# Patient Record
Sex: MALE | Race: BLACK OR AFRICAN AMERICAN | NOT HISPANIC OR LATINO | URBAN - METROPOLITAN AREA
[De-identification: names, ages, dates, MRNs, and addresses within clinical notes are randomized per-mention and may not be internally consistent; named-entity substitution may affect disease eponyms.]

---

## 2020-07-16 ENCOUNTER — HOSPITAL ENCOUNTER (EMERGENCY)
Facility: HOSPITAL | Age: 25
Discharge: HOME | End: 2020-07-16
Admitting: EMERGENCY MEDICINE

## 2020-07-16 VITALS
SYSTOLIC BLOOD PRESSURE: 128 MMHG | DIASTOLIC BLOOD PRESSURE: 80 MMHG | WEIGHT: 220.9 LBS | OXYGEN SATURATION: 99 % | BODY MASS INDEX: 39.14 KG/M2 | HEIGHT: 63 IN | TEMPERATURE: 98.4 F | RESPIRATION RATE: 18 BRPM | HEART RATE: 94 BPM

## 2020-07-16 DIAGNOSIS — R51.9 ACUTE NONINTRACTABLE HEADACHE, UNSPECIFIED HEADACHE TYPE: ICD-10-CM

## 2020-07-16 DIAGNOSIS — Z20.822 CLOSE EXPOSURE TO COVID-19 VIRUS: Primary | ICD-10-CM

## 2020-07-16 PROCEDURE — 99283 EMERGENCY DEPT VISIT LOW MDM: CPT

## 2020-07-16 PROCEDURE — C9803 HOPD COVID-19 SPEC COLLECT: HCPCS | Performed by: PHYSICIAN ASSISTANT

## 2020-07-16 PROCEDURE — U0002 COVID-19 LAB TEST NON-CDC: HCPCS | Performed by: PHYSICIAN ASSISTANT

## 2020-07-16 PROCEDURE — U0004 COV-19 TEST NON-CDC HGH THRU: HCPCS | Performed by: PHYSICIAN ASSISTANT

## 2020-07-17 ENCOUNTER — PATIENT OUTREACH (OUTPATIENT)
Dept: CASE MANAGEMENT | Facility: OTHER | Age: 25
End: 2020-07-17

## 2020-07-17 LAB
REF LAB TEST METHOD: NORMAL
SARS-COV-2 RNA RESP QL NAA+PROBE: NOT DETECTED

## 2025-03-17 ENCOUNTER — HOSPITAL ENCOUNTER (OUTPATIENT)
Facility: HOSPITAL | Age: 30
Discharge: HOME | End: 2025-03-17
Attending: EMERGENCY MEDICINE | Admitting: EMERGENCY MEDICINE
Payer: MEDICAID

## 2025-03-17 ENCOUNTER — APPOINTMENT (OUTPATIENT)
Dept: GENERAL RADIOLOGY | Facility: HOSPITAL | Age: 30
End: 2025-03-17
Payer: MEDICAID

## 2025-03-17 VITALS
HEART RATE: 76 BPM | TEMPERATURE: 98.5 F | WEIGHT: 182.2 LBS | DIASTOLIC BLOOD PRESSURE: 72 MMHG | RESPIRATION RATE: 18 BRPM | HEIGHT: 64 IN | SYSTOLIC BLOOD PRESSURE: 132 MMHG | OXYGEN SATURATION: 99 % | BODY MASS INDEX: 31.1 KG/M2

## 2025-03-17 DIAGNOSIS — M79.641 RIGHT HAND PAIN: Primary | ICD-10-CM

## 2025-03-17 PROCEDURE — G0463 HOSPITAL OUTPT CLINIC VISIT: HCPCS

## 2025-03-17 PROCEDURE — 73130 X-RAY EXAM OF HAND: CPT

## 2025-03-17 RX ORDER — IBUPROFEN 400 MG/1
800 TABLET, FILM COATED ORAL ONCE
Status: COMPLETED | OUTPATIENT
Start: 2025-03-17 | End: 2025-03-17

## 2025-03-17 RX ORDER — IBUPROFEN 800 MG/1
800 TABLET, FILM COATED ORAL EVERY 6 HOURS PRN
Qty: 28 TABLET | Refills: 0 | Status: SHIPPED | OUTPATIENT
Start: 2025-03-17 | End: 2025-03-24

## 2025-03-17 RX ADMIN — IBUPROFEN 800 MG: 400 TABLET, FILM COATED ORAL at 18:10

## 2025-03-17 NOTE — Clinical Note
[facility] [place] [id number] [place] [address] [place] [address]  Phone: [phone]    Sudeep Jacobson was seen and treated in our emergency department on 3/17/2025.  He may return to work on 03/18/2025.         Thank you for choosing [facility].    Barbara Dasilva, VERENA

## 2025-03-17 NOTE — FSED PROVIDER NOTE
Subjective   History of Present Illness  Patient is a 29-year-old male who presents today with right hand.  He denies any new injury but does report that 2 years ago he broke his hand.  He reports he was helping move and noticed the swelling shortly after.  He denies numbness, tingling or loss of sensation.  He has no snuffbox tenderness.        Review of Systems   All other systems reviewed and are negative.      No past medical history on file.    No Known Allergies    No past surgical history on file.    No family history on file.    Social History     Socioeconomic History    Marital status:            Objective   Physical Exam  Vitals and nursing note reviewed.   Constitutional:       General: He is not in acute distress.     Appearance: Normal appearance. He is not ill-appearing, toxic-appearing or diaphoretic.   HENT:      Head: Normocephalic.      Nose: No congestion or rhinorrhea.      Mouth/Throat:      Mouth: Mucous membranes are moist.      Pharynx: No oropharyngeal exudate or posterior oropharyngeal erythema.   Eyes:      General:         Right eye: No discharge.         Left eye: No discharge.      Pupils: Pupils are equal, round, and reactive to light.   Cardiovascular:      Rate and Rhythm: Normal rate and regular rhythm.      Heart sounds: No murmur heard.     No friction rub.   Pulmonary:      Effort: Pulmonary effort is normal. No respiratory distress.      Breath sounds: Normal breath sounds. No stridor. No wheezing, rhonchi or rales.   Chest:      Chest wall: No tenderness.   Abdominal:      General: Abdomen is flat. Bowel sounds are normal. There is no distension.      Palpations: Abdomen is soft.      Tenderness: There is no abdominal tenderness. There is no right CVA tenderness, left CVA tenderness, guarding or rebound.      Hernia: No hernia is present.   Musculoskeletal:         General: No swelling, tenderness, deformity or signs of injury. Normal range of motion.      Cervical  back: Normal range of motion.      Right lower leg: No edema.      Left lower leg: No edema.   Skin:     General: Skin is warm.      Capillary Refill: Capillary refill takes less than 2 seconds.      Coloration: Skin is not jaundiced or pale.      Findings: No bruising, erythema, lesion or rash.   Neurological:      General: No focal deficit present.      Mental Status: He is alert and oriented to person, place, and time.      Cranial Nerves: No cranial nerve deficit.      Sensory: No sensory deficit.      Motor: No weakness.      Coordination: Coordination normal.      Gait: Gait normal.      Deep Tendon Reflexes: Reflexes normal.   Psychiatric:         Mood and Affect: Mood normal.         Behavior: Behavior normal.         Thought Content: Thought content normal.         Judgment: Judgment normal.         Procedures           ED Course                                           Medical Decision Making  Patient is a 29-year-old male who presents today with right hand.  He denies any new injury but does report that 2 years ago he broke his hand.  He reports he was helping move and noticed the swelling shortly after.  He denies numbness, tingling or loss of sensation.  He has no snuffbox tenderness.    Upon exam patient is awake and alert, nontoxic-appearing, appears in no acute distress, and is answering questions appropriately.  Lung sounds are clear and equal bilaterally.  Heart is normal rate and rhythm.  Mucous membranes are moist.  Patient reports right hand.  There is no edema, erythema or trauma present.  Patient has full range of motion.  Cap refills less than 2, and he has a strong radial pulse.  An x-ray was obtained and showed nothing acute.  I treated him with an 800 ibuprofen and advised him to ice and elevate his hand and 20-minute increments several times a day. I Advised him to return to the ER for any new or worsening symptom.    Problems Addressed:  Right hand pain: complicated acute illness or  injury    Amount and/or Complexity of Data Reviewed  Radiology: ordered.    Risk  Prescription drug management.        Final diagnoses:   Right hand pain       ED Disposition  ED Disposition       ED Disposition   Discharge    Condition   Stable    Comment   --               Mary Bailey MD  [address]  [address]  [place] [id number] [phone]          [facility] [place]  [address]  [facility] [address]  [phone]  In 1 week  As needed, If symptoms worsen    PATIENT CONNECTION - [facility] [address]  [phone]  Schedule an appointment as soon as possible for a visit            Medication List        New Prescriptions      ibuprofen 800 MG tablet  Commonly known as: ADVIL,MOTRIN  Take 1 tablet by mouth Every 6 (Six) Hours As Needed for Mild Pain for up to 7 days.               Where to Get Your Medications        These medications were sent to [id number] - [place], [place] - [address] [facility] [phone] [facility] [phone]   [address] [place] [address]      Phone: [phone]   ibuprofen 800 MG tablet

## 2025-04-27 ENCOUNTER — HOSPITAL ENCOUNTER (EMERGENCY)
Facility: HOSPITAL | Age: 30
Discharge: HOME | End: 2025-04-27
Attending: EMERGENCY MEDICINE | Admitting: EMERGENCY MEDICINE

## 2025-04-27 ENCOUNTER — APPOINTMENT (OUTPATIENT)
Dept: GENERAL RADIOLOGY | Facility: HOSPITAL | Age: 30
End: 2025-04-27

## 2025-04-27 VITALS
WEIGHT: 188.4 LBS | TEMPERATURE: 97.8 F | RESPIRATION RATE: 18 BRPM | DIASTOLIC BLOOD PRESSURE: 72 MMHG | BODY MASS INDEX: 32.17 KG/M2 | HEIGHT: 64 IN | HEART RATE: 82 BPM | SYSTOLIC BLOOD PRESSURE: 136 MMHG | OXYGEN SATURATION: 98 %

## 2025-04-27 DIAGNOSIS — S60.221A CONTUSION OF RIGHT HAND, INITIAL ENCOUNTER: Primary | ICD-10-CM

## 2025-04-27 PROCEDURE — 73130 X-RAY EXAM OF HAND: CPT

## 2025-04-27 PROCEDURE — 99283 EMERGENCY DEPT VISIT LOW MDM: CPT

## 2025-04-28 NOTE — DISCHARGE INSTRUCTIONS
You may soak with Epsom salt warm water, use ice if desired.  Take Tylenol and ibuprofen as needed for pain.  You can elevate as well.    There is no evidence of fracture on your x-ray.  They do see an old healed fifth metacarpal fracture.    Return to the emergency department for worsening symptoms, worsening swelling or pain.

## 2025-04-28 NOTE — FSED PROVIDER NOTE
Subjective   History of Present Illness  29-year-old male presents with pain in the right hand.  He states the cover to a barge crossed his hand on Thursday.  He states that he is here today because now the adrenaline has finally subsided and he is having some pain.  His capillary refill is brisk.  He has full range of motion to the hand but tenderness around the third metacarpal.  I am awaiting x-ray results.  Patient denied offer of Tylenol.  He took ibuprofen prior to arrival, 800 mg    History provided by:  Patient   used: No        Review of Systems   Musculoskeletal:  Positive for arthralgias (Right hand pain, patient is right-hand dominant) and joint swelling (Minimal swelling around the third metacarpal).   Skin:  Negative for color change, pallor and wound.   All other systems reviewed and are negative.      History reviewed. No pertinent past medical history.    No Known Allergies    History reviewed. No pertinent surgical history.    History reviewed. No pertinent family history.    Social History     Socioeconomic History    Marital status:    Tobacco Use    Smoking status: Never   Substance and Sexual Activity    Alcohol use: Yes     Comment: rare           Objective   Physical Exam  Vitals and nursing note reviewed.   Constitutional:       General: He is not in acute distress.     Appearance: Normal appearance. He is not ill-appearing, toxic-appearing or diaphoretic.   Pulmonary:      Effort: Pulmonary effort is normal.   Musculoskeletal:         General: Swelling (Mid swelling to the third metacarpal), tenderness and signs of injury present. No deformity. Normal range of motion.      Comments: Complains of some numbness to the area.   Skin:     General: Skin is warm and dry.      Capillary Refill: Capillary refill takes less than 2 seconds.      Findings: No erythema, lesion or rash.   Neurological:      Mental Status: He is alert and oriented to person, place, and time.    Psychiatric:         Mood and Affect: Mood normal.         Behavior: Behavior normal.         Procedures           ED Course  ED Course as of 04/27/25 2310   Sun Apr 27, 2025   [address] Reading Physician Reading Date Result Priority  Lucas Parekh MD  [phone]  4/27/2025 STAT    Narrative & Impression  XR HAND 3+ VW RIGHT     Date of Exam: 4/27/2025 9:48 PM EDT     Indication: right hand pain s/p 2000 lb lid falling on hand 4 days ago     Comparison: 3/17/2025.     Findings:  There is a stable old healed fifth metacarpal shaft fracture with mild residual dorsal bowing. There is no acute fracture or dislocation. No erosions. Soft tissues are unremarkable. Joint spaces appear intact.     IMPRESSION:  Impression:  1.No acute osseous abnormality.  2.Old healed fifth metacarpal fracture.              Electronically Signed: Lucas Parekh MD    4/27/2025 11:02 PM EDT    Workstation ID: [id number]   [SJ]      ED Course User Index  [SJ] Ca Lebron APRN                                           Medical Decision Making  29-year-old male presents with pain in the right hand.  He states the cover to a barge crossed his hand on Thursday.  He states that he is here today because now the adrenaline has finally subsided and he is having some pain.  His capillary refill is brisk.  He has full range of motion to the hand but tenderness around the third metacarpal.  I am awaiting x-ray results.  Patient denied offer of Tylenol.  He took ibuprofen prior to arrival, 800 mg.  I advised patient that he could leave without the x-ray being read.  But he elected to stay until radiology read it.  Radiology has read the image and there is no acute osseous abnormality there is an old healed fifth metacarpal fracture.  Patient was advised of this, reasons for return were discussed and he verbalized understanding.    Problems Addressed:  Contusion of right hand, initial encounter: complicated acute illness or injury    Amount and/or  Complexity of Data Reviewed  Radiology: ordered.        Final diagnoses:   Contusion of right hand, initial encounter       ED Disposition  ED Disposition       ED Disposition   Discharge    Condition   Stable    Comment   --               Mary Bailey MD  [address] [id number] [phone]    Schedule an appointment as soon as possible for a visit            Medication List      No changes were made to your prescriptions during this visit.